# Patient Record
(demographics unavailable — no encounter records)

---

## 2018-08-23 NOTE — EDDOCDS
Nurse's Notes                                                                                     

[facility]                                                                         

Name: Sven Ham                                                                              

Age: 4 yrs                                                                                        

Sex: Male                                                                                         

: 2012                                                                                   

MRN: [id number]                                                                                     

Arrival Date: 2017                                                                          

Time: 20:59                                                                                       

Account#: [id number]                                                                              

Bed PD                                                                                      

Private MD: Rina De S.                                                                  

Diagnosis: Acute gastritis;Vomiting                                                               

                                                                                                  

Presentation:                                                                                     

                                                                                             

21:03 Presenting complaint: Mother states: "not been himself" . Child has a diagnosed and     ttb 

      treated ear infection. Amox started . Augmentin started . Now started       

      on another abx "starts with C" today. Vomiting started today. Suicide/Homicide risk         

      assessment- the patient denies having any suicidal and/or homicidal ideations and does      

      not present with any other emotional, behavioral or mental health complaints.       

      Status: Patient is not a  or  dependent. Transition of care:          

      patient was not received from another setting of care.                                      

21:03 Method Of Arrival: Walkin/Carried/Asstd                                                 ttb 

21:03 Acuity: ALTHEA Level 4                                                                     ttb 

                                                                                                  

Triage Assessment:                                                                                

21:07 General: Appears in no apparent distress, well nourished, well groomed, Behavior is     ttb 

      appropriate for age, quiet. Pain: Location: left ear "popping". Neurological: Level of      

      Consciousness is awake, alert. EENT: Reports "popping in there" pointing to left ear.       

      Cardiovascular: Chest pain is denied. Respiratory: No deficits noted. Airway is patent      

      Denies cough, shortness of breath. GI: Parent/caregiver reports the patient having          

      vomiting. Derm: Skin is normal. Injury Description: No known injury.                        

                                                                                                  

Historical:                                                                                       

- Allergies: no known allergies;                                                                  

- Home Meds:                                                                                      

1. "abx starts with C" 4.5 mL daily has not started yet                                         

- PMHx: chronic constipation;                                                                     

- PSHx: none;                                                                                     

- Social history: No barriers to communication noted, Speaks appropriately for age.               

- Family history: Not pertinent.                                                                  

- : The pt / caregiver states he / she is not on anticoagulants. Home medication list             

is obtained from family members, Childhood immunizations are up to date.                        

- Exposure Risk Screening:: None identified.                                                      

- History obtained from: mother.                                                                  

                                                                                                  

                                                                                                  

Screenin:36 Screening information is obtained from the parent. Fall risk: No risks identified.      dsf 

      Abuse/DV Screen: The patient / caregiver reports he/she is: not in a situation that         

      causes fear, pain or injury. Nutritional screening: No deficits noted. home support is      

      adequate.                                                                                   

                                                                                                  

Assessment:                                                                                       

22:35 General: Appears ill, Behavior is appropriate for age, cooperative. Neurological: Level dsf 

      of Consciousness is awake, alert. Respiratory: Airway is patent Respiratory effort is       

      even, unlabored, Respiratory pattern is regular, symmetrical. GI: Parent/caregiver          

      reports the patient having vomiting. Derm: Skin is pink, warm & dry. No Injury is noted   

      or reported. The interaction between the parent and child appears to be appropriate.        

22:36 No prior history available.                                                             dsf 

22:57 General: Appears to be sleeping. Behavior is quiet. GI: Abdomen is non- distended Bowel nn1 

      sounds present X 4 quads. Abd is soft X 4 quads Parent/caregiver reports the patient        

      having vomiting. Derm: Skin is pink, warm & dry.                                          

22:58 General: Mother reports vomiting since 0500am. Patient is not actively vomiting at this nn1 

      time. .                                                                                     

23:35 General: Patient given apple juice. patient states he is feeling better. Patient has    nn1 

      been sleeping in room, quiet. No vomiting at this time. .                                   

23:48 General: Appears in no apparent distress, Behavior is appropriate for age, cooperative. dsf 

      Neurological: Level of Consciousness is awake, alert. Cardiovascular: Capillary refill      

      < 3 seconds. Respiratory: Airway is patent Respiratory effort is even, unlabored,           

      Respiratory pattern is regular, symmetrical. Derm: Skin is pink, warm & dry.              

                                                                                                  

Vital Signs:                                                                                      

21:03 BP 95 / 54; Pulse 107; Resp 20; Temp 97.1(O); Pulse Ox 97% on R/A; Weight 16.33 kg (M); elp 

                                                                                                  

Vitals:                                                                                           

21:03 Log In Time: 2017 at 21:00.                                                elp 

21:07 Does not meet SIRS criteria.                                                            ttb 

23:51 Growth chart printed and placed in chart.                                               dsf 

                                                                                                  

ED Course:                                                                                        

21:01 Patient visited by Adele Kaplan PCA.                                                  elp 

21:01 Patient moved to Waiting                                                                elp 

21:02 Rina De is Private Physician.                                                  elp 

21:03 Patient moved to Pre RCE                                                                elp 

21:04 Patient visited by Adele Kaplan PCA.                                                  elp 

21:06 Triage Initiated                                                                        ttb 

21:08 Patient visited by Mary Ann Millan RN.                                                  ttb 

22:35 Patient moved to Triage 1                                                               dsf 

22:36 Patient visited by Enid Roberts,AMY.                                                   dsf 

22:36 The patient / caregiver is instructed regarding the plan of care and ED course.         dsf 

22:45 [facility]                                                              ke  

22:46 Patient visited by Alex Villa FNP.                                                   ke  

22:46 Patient visited by Alex Villa FNP.                                                   ke  

22:58 Patient moved to PD2 /                                                                dsf 

23:12 [facility] Payment Agreement was scanned into Assembly and attached to record.               [id number]

23:16 Patient visited by Alex Villa FNP.                                                   ke  

23:37 Rina De is Referral Physician.                                                 ke  

23:50 No IV's were initiated during this patient's visit. No procedures done that require     dsf 

      assistance.                                                                                 

                                                                                             

11:33 T-Sheet-- Draft Copy was scanned into Assembly and attached to record.                   gb  

                                                                                                  

Administered Medications:                                                                         

                                                                                             

22:58 Drug: Ondansetron ODT (Peds 13-25kg) Oral Disintegrating Tablet 2 mg Route: PO;         nn1 

                                                                                                  

                                                                                                  

Order Results:                                                                                    

There are currently no results for this order.                                                    

Outcome:                                                                                          

23:38 Discharge ordered by Provider.                                                          ke  

23:50 Discharge Assessment: Patient awake, alert and oriented x 3. No cognitive and/or        dsf 

      functional deficits noted. Patient verbalized understanding of disposition                  

      instructions. The following High Risk Discharge criteria are identified: None.              

      Discharged to home ambulatory, with parent. Condition: stable. Discharge instructions       

      given to mother Instructed on discharge instructions, follow up and referral plans.         

      medication usage, diet, Demonstrated understanding of instructions, medications, Pt was     

      receptive of discharge instructions/ teaching. Prescriptions given X 1. No special          

      radiology studies were completed. Property sent home with patient.                          

23:51 Patient left the ED.                                                                    dsf 

                                                                                                  

Signatures:                                                                                       

Blessing Garcia, Reg                  Reg  gb                                                   

Alex Villa FNP FNP ke Fuller, Desiree,Mary Ann Gaines RN, RN                      RN   Adele Moran, PCA                      PCA  wendyp                                                  

Kym NyRN                        RN   nn1                                                  

Stefania Heard, Reg                 Reg  ks16                                                 

                                                                                                  

**************************************************************************************************



*** Chart Complete ***
MTDD
Nurse's Notes                                                                                     

[facility]                                                                         

Name: Sven Ham                                                                              

Age: 4 yrs                                                                                        

Sex: Male                                                                                         

: 2012                                                                                   

MRN: [id number]                                                                                     

Arrival Date: 2017                                                                          

Time: 20:59                                                                                       

Account#: [id number]                                                                              

Bed PD                                                                                      

Private MD: Rina De S.                                                                  

Diagnosis: Acute gastritis;Vomiting                                                               

                                                                                                  

Presentation:                                                                                     

                                                                                             

21:03 Presenting complaint: Mother states: "not been himself" . Child has a diagnosed and     ttb 

      treated ear infection. Amox started . Augmentin started . Now started       

      on another abx "starts with C" today. Vomiting started today. Suicide/Homicide risk         

      assessment- the patient denies having any suicidal and/or homicidal ideations and does      

      not present with any other emotional, behavioral or mental health complaints.       

      Status: Patient is not a  or  dependent. Transition of care:          

      patient was not received from another setting of care.                                      

21:03 Method Of Arrival: Walkin/Carried/Asstd                                                 ttb 

21:03 Acuity: ALTHEA Level 4                                                                     ttb 

                                                                                                  

Triage Assessment:                                                                                

21:07 General: Appears in no apparent distress, well nourished, well groomed, Behavior is     ttb 

      appropriate for age, quiet. Pain: Location: left ear "popping". Neurological: Level of      

      Consciousness is awake, alert. EENT: Reports "popping in there" pointing to left ear.       

      Cardiovascular: Chest pain is denied. Respiratory: No deficits noted. Airway is patent      

      Denies cough, shortness of breath. GI: Parent/caregiver reports the patient having          

      vomiting. Derm: Skin is normal. Injury Description: No known injury.                        

                                                                                                  

Historical:                                                                                       

- Allergies: no known allergies;                                                                  

- Home Meds:                                                                                      

1. "abx starts with C" 4.5 mL daily has not started yet                                         

- PMHx: chronic constipation;                                                                     

- PSHx: none;                                                                                     

- Social history: No barriers to communication noted, Speaks appropriately for age.               

- Family history: Not pertinent.                                                                  

- : The pt / caregiver states he / she is not on anticoagulants. Home medication list             

is obtained from family members, Childhood immunizations are up to date.                        

- Exposure Risk Screening:: None identified.                                                      

- History obtained from: mother.                                                                  

                                                                                                  

                                                                                                  

Screenin:36 Screening information is obtained from the parent. Fall risk: No risks identified.      dsf 

      Abuse/DV Screen: The patient / caregiver reports he/she is: not in a situation that         

      causes fear, pain or injury. Nutritional screening: No deficits noted. home support is      

      adequate.                                                                                   

                                                                                                  

Assessment:                                                                                       

22:35 General: Appears ill, Behavior is appropriate for age, cooperative. Neurological: Level dsf 

      of Consciousness is awake, alert. Respiratory: Airway is patent Respiratory effort is       

      even, unlabored, Respiratory pattern is regular, symmetrical. GI: Parent/caregiver          

      reports the patient having vomiting. Derm: Skin is pink, warm & dry. No Injury is noted   

      or reported. The interaction between the parent and child appears to be appropriate.        

22:36 No prior history available.                                                             dsf 

22:57 General: Appears to be sleeping. Behavior is quiet. GI: Abdomen is non- distended Bowel nn1 

      sounds present X 4 quads. Abd is soft X 4 quads Parent/caregiver reports the patient        

      having vomiting. Derm: Skin is pink, warm & dry.                                          

22:58 General: Mother reports vomiting since 0500am. Patient is not actively vomiting at this nn1 

      time. .                                                                                     

23:35 General: Patient given apple juice. patient states he is feeling better. Patient has    nn1 

      been sleeping in room, quiet. No vomiting at this time. .                                   

23:48 General: Appears in no apparent distress, Behavior is appropriate for age, cooperative. dsf 

      Neurological: Level of Consciousness is awake, alert. Cardiovascular: Capillary refill      

      < 3 seconds. Respiratory: Airway is patent Respiratory effort is even, unlabored,           

      Respiratory pattern is regular, symmetrical. Derm: Skin is pink, warm & dry.              

                                                                                                  

Vital Signs:                                                                                      

21:03 BP 95 / 54; Pulse 107; Resp 20; Temp 97.1(O); Pulse Ox 97% on R/A; Weight 16.33 kg (M); elp 

                                                                                                  

Vitals:                                                                                           

21:03 Log In Time: 2017 at 21:00.                                                elp 

21:07 Does not meet SIRS criteria.                                                            ttb 

23:51 Growth chart printed and placed in chart.                                               dsf 

                                                                                                  

ED Course:                                                                                        

21:01 Patient visited by Adele Kaplan PCA.                                                  elp 

21:01 Patient moved to Waiting                                                                elp 

21:02 Rina De is Private Physician.                                                  elp 

21:03 Patient moved to Pre RCE                                                                elp 

21:04 Patient visited by Adele Kaplan PCA.                                                  elp 

21:06 Triage Initiated                                                                        ttb 

21:08 Patient visited by Mary Ann Mlilan RN.                                                  ttb 

22:35 Patient moved to Triage 1                                                               dsf 

22:36 Patient visited by Enid Roberts RN.                                                   dsf 

22:36 The patient / caregiver is instructed regarding the plan of care and ED course.         dsf 

22:45 [facility]                                                              ke  

22:46 Patient visited by Alex Villa FNP.                                                   ke  

22:46 Patient visited by Alex Villa FNP.                                                   ke  

22:58 Patient moved to PD2 /                                                                dsf 

23:12 [facility] Payment Agreement was scanned into [employer] and attached to record.               [id number]

23:16 Patient visited by Alex Villa FNP.                                                   ke  

23:37 Rina De is Referral Physician.                                                 ke  

23:50 No IV's were initiated during this patient's visit. No procedures done that require     dsf 

      assistance.                                                                                 

                                                                                                  

Administered Medications:                                                                         

22:58 Drug: Ondansetron ODT (Peds 13-25kg) Oral Disintegrating Tablet 2 mg Route: PO;         nn1 

                                                                                                  

                                                                                                  

Order Results:                                                                                    

There are currently no results for this order.                                                    

Outcome:                                                                                          

23:38 Discharge ordered by Provider.                                                          ke  

23:50 Discharge Assessment: Patient awake, alert and oriented x 3. No cognitive and/or        dsf 

      functional deficits noted. Patient verbalized understanding of disposition                  

      instructions. The following High Risk Discharge criteria are identified: None.              

      Discharged to home ambulatory, with parent. Condition: stable. Discharge instructions       

      given to mother Instructed on discharge instructions, follow up and referral plans.         

      medication usage, diet, Demonstrated understanding of instructions, medications, Pt was     

      receptive of discharge instructions/ teaching. Prescriptions given X 1. No special          

      radiology studies were completed. Property sent home with patient.                          

23:51 Patient left the ED.                                                                    dsf 

                                                                                                  

Signatures:                                                                                       

Alex Villa FNP FNP ke Fuller, Desiree,Mary Ann Gaines RN, RN                      RN   ttb                                                  

Adele Kaplan, PCA                      PCA  Kym HuntRN                        RN   nn1                                                  

Stefania Heard, Reg                 Reg  ks16                                                 

                                                                                                  

**************************************************************************************************

MTDD
Physician Documentation                                                                           

[facility]                                                                         

Name: Sven Ham                                                                              

Age: 4 yrs                                                                                        

Sex: Male                                                                                         

: 2012                                                                                   

MRN: [id number]                                                                                     

Arrival Date: 2017                                                                          

Time: 20:59                                                                                       

Account#: [id number]                                                                              

Bed PD                                                                                      

Private MD: Rina De S.                                                                  

Disposition:                                                                                      

17 23:38 Discharged to Home/Self Care. Impression: Acute gastritis, Vomiting.               

- Condition is Stable.                                                                            

- Discharge Instructions: Nausea and Vomiting, Vomiting, Pediatric.                               

- Prescriptions for ZOFRAN ODT 4 mg Oral - dissolve 0.5 tablet by ORAL route 4 times              

per day As needed do not chew, do not swallow whole; 8 tablet.                                  

- Medication Reconciliation, Local Pharmacy Hours form.                                           

- Follow up: Rina De; When: 4 - 5 days; Reason: Recheck today's complaints,              

Continuance of care.                                                                            

- Problem is an ongoing problem.                                                                  

- Symptoms have improved.                                                                         

                                                                                                  

                                                                                                  

                                                                                                  

Historical:                                                                                       

- Allergies: no known allergies;                                                                  

- Home Meds:                                                                                      

1. "abx starts with C" 4.5 mL daily has not started yet                                         

- PMHx: chronic constipation;                                                                     

- PSHx: none;                                                                                     

- Social history: No barriers to communication noted, Speaks appropriately for age.               

- Family history: Not pertinent.                                                                  

- : The pt / caregiver states he / she is not on anticoagulants. Home medication list             

is obtained from family members, Childhood immunizations are up to date.                        

- Exposure Risk Screening:: None identified.                                                      

- History obtained from: mother.                                                                  

                                                                                                  

                                                                                                  

Vital Signs:                                                                                      

                                                                                             

21:03 BP 95 / 54; Pulse 107; Resp 20; Temp 97.1(O); Pulse Ox 97% on R/A; Weight 16.33 kg / 36 elp 

      lbs 0 oz (M);                                                                               

                                                                                                  

MDM:                                                                                              

22:52 Ondansetron ODT (Peds 13-25kg) Oral Disintegrating Tablet 2 mg PO once ordered.         ke  

22:52 Fluid Challenge ordered.                                                                ke  

23:12 Financial registration complete.                                                        [id number]

23:12 [facility] Payment Agreement was scanned into [facility] and attached to record.               ks16

                                                                                             

11:33 T-Sheet-- Draft Copy was scanned into [facility] and attached to record.                   gb  

                                                                                                  

Administered Medications:                                                                         

                                                                                             

22:58 Drug: Ondansetron ODT (Peds 13-25kg) Oral Disintegrating Tablet 2 mg Route: PO;         nn1 

                                                                                                  

                                                                                                  

Signatures:                                                                                       

Blessing Garcia, Reg                  Reg  gb                                                   

Alex Villa, Enid DavisRN                       RN   Mary Ann Tripathi RN                      RN   ttStefania Perez, Reg                 Reg  ks16                                                 

Kym Ny RN   nn1                                                  

                                                                                                  

The chart was reviewed and I authenticate all verbal orders and agree with the evaluation and 
treatment provided.Attachments:

23:12 [facility] Payment Agreement                                                                ks16

                                                                                             

11:33 T-Sheet-- Draft Copy                                                                    gb  

                                                                                                  

**************************************************************************************************



*** Chart Complete ***
MTDD
Physician Documentation                                                                           

[facility]                                                                         

Name: Sven Ham                                                                              

Age: 4 yrs                                                                                        

Sex: Male                                                                                         

: 2012                                                                                   

MRN: [id number]                                                                                     

Arrival Date: 2017                                                                          

Time: 20:59                                                                                       

Account#: [id number]                                                                              

Bed PD                                                                                      

Private MD: Rina De S.                                                                  

Disposition:                                                                                      

17 23:38 Discharged to Home/Self Care. Impression: Acute gastritis, Vomiting.               

- Condition is Stable.                                                                            

- Discharge Instructions: Nausea and Vomiting, Vomiting, Pediatric.                               

- Prescriptions for ZOFRAN ODT 4 mg Oral - dissolve 0.5 tablet by ORAL route 4 times              

per day As needed do not chew, do not swallow whole; 8 tablet.                                  

- Medication Reconciliation, Local Pharmacy Hours form.                                           

- Follow up: Rina De; When: 4 - 5 days; Reason: Recheck today's complaints,              

Continuance of care.                                                                            

- Problem is an ongoing problem.                                                                  

- Symptoms have improved.                                                                         

                                                                                                  

                                                                                                  

                                                                                                  

Historical:                                                                                       

- Allergies: no known allergies;                                                                  

- Home Meds:                                                                                      

1. "abx starts with C" 4.5 mL daily has not started yet                                         

- PMHx: chronic constipation;                                                                     

- PSHx: none;                                                                                     

- Social history: No barriers to communication noted, Speaks appropriately for age.               

- Family history: Not pertinent.                                                                  

- : The pt / caregiver states he / she is not on anticoagulants. Home medication list             

is obtained from family members, Childhood immunizations are up to date.                        

- Exposure Risk Screening:: None identified.                                                      

- History obtained from: mother.                                                                  

                                                                                                  

                                                                                                  

Vital Signs:                                                                                      

                                                                                             

21:03 BP 95 / 54; Pulse 107; Resp 20; Temp 97.1(O); Pulse Ox 97% on R/A; Weight 16.33 kg / 36 elp 

      lbs 0 oz (M);                                                                               

                                                                                                  

MDM:                                                                                              

22:52 Ondansetron ODT (Peds 13-25kg) Oral Disintegrating Tablet 2 mg PO once ordered.         ke  

22:52 Fluid Challenge ordered.                                                                ke  

23:12 Financial registration complete.                                                        [facility]

23:12 [facility] Payment Agreement was scanned into [employer] and attached to record.               ks

                                                                                                  

Administered Medications:                                                                         

22:58 Drug: Ondansetron ODT (Peds 13-25kg) Oral Disintegrating Tablet 2 mg Route: PO;         nn1 

                                                                                                  

                                                                                                  

Signatures:                                                                                       

Alex Villa, SHIMAP                       FNP  Enid Steve,RN                       RN   Mary Ann Tripathi RN                      RN   Stefania Olivares, Reg                 Reg  ks16                                                 

Kym Ny RN   nn1                                                  

                                                                                                  

The chart was reviewed and I authenticate all verbal orders and agree with the evaluation and 
treatment provided.Attachments:

23:12 [facility] Payment Agreement                                                                ks16

                                                                                                  

**************************************************************************************************

MTDD
Physician Documentation                                                                           

[facility]                                                                         

Name: Sven Ham                                                                              

Age: 4 yrs                                                                                        

Sex: Male                                                                                         

: 2012                                                                                   

MRN: [id number]                                                                                     

Arrival Date: 2017                                                                          

Time: 20:59                                                                                       

Account#: [id number]                                                                              

Bed PD                                                                                      

Private MD: Rina De S.                                                                  

Disposition:                                                                                      

17 23:38 Discharged to Home/Self Care. Impression: Acute gastritis, Vomiting.               

- Condition is Stable.                                                                            

- Discharge Instructions: Nausea and Vomiting, Vomiting, Pediatric.                               

- Prescriptions for ZOFRAN ODT 4 mg Oral - dissolve 0.5 tablet by ORAL route 4 times              

per day As needed do not chew, do not swallow whole; 8 tablet.                                  

- Medication Reconciliation, Local Pharmacy Hours form.                                           

- Follow up: Rina De; When: 4 - 5 days; Reason: Recheck today's complaints,              

Continuance of care.                                                                            

- Problem is an ongoing problem.                                                                  

- Symptoms have improved.                                                                         

                                                                                                  

                                                                                                  

                                                                                                  

Historical:                                                                                       

- Allergies: no known allergies;                                                                  

- Home Meds:                                                                                      

1. "abx starts with C" 4.5 mL daily has not started yet                                         

- PMHx: chronic constipation;                                                                     

- PSHx: none;                                                                                     

- Social history: No barriers to communication noted, Speaks appropriately for age.               

- Family history: Not pertinent.                                                                  

- : The pt / caregiver states he / she is not on anticoagulants. Home medication list             

is obtained from family members, Childhood immunizations are up to date.                        

- Exposure Risk Screening:: None identified.                                                      

- History obtained from: mother.                                                                  

                                                                                                  

                                                                                                  

Vital Signs:                                                                                      

                                                                                             

21:03 BP 95 / 54; Pulse 107; Resp 20; Temp 97.1(O); Pulse Ox 97% on R/A; Weight 16.33 kg / 36 elp 

      lbs 0 oz (M);                                                                               

                                                                                                  

MDM:                                                                                              

22:52 Ondansetron ODT (Peds 13-25kg) Oral Disintegrating Tablet 2 mg PO once ordered.         ke  

22:52 Fluid Challenge ordered.                                                                ke  

23:12 Financial registration complete.                                                        ks16

23:12 [facility] Payment Agreement was scanned into [employer] and attached to record.               ks16

                                                                                             

11:33 T-Sheet-- Draft Copy was scanned into [employer] and attached to record.                   gb  

                                                                                                  

Administered Medications:                                                                         

                                                                                             

22:58 Drug: Ondansetron ODT (Peds 13-25kg) Oral Disintegrating Tablet 2 mg Route: PO;         nn1 

                                                                                                  

                                                                                                  

Signatures:                                                                                       

Blessing Garcia, Reg                  Reg  gb                                                   

Alex Villa, Enid DavisRN                       RN   Mary Ann Tripathi RN                      RN   ttStefania Perez, Reg                 Reg  ks16                                                 

Kym Ny RN   nn1                                                  

                                                                                                  

The chart was reviewed and I authenticate all verbal orders and agree with the evaluation and 
treatment provided.Attachments:

23:12 [facility] Payment Agreement                                                                [id number]

                                                                                             

11:33 T-Sheet-- Draft Copy                                                                    gb  

                                                                                                  

**************************************************************************************************



*** Chart Complete ***
MTDD
None